# Patient Record
Sex: MALE | Race: BLACK OR AFRICAN AMERICAN | Employment: UNEMPLOYED | URBAN - METROPOLITAN AREA
[De-identification: names, ages, dates, MRNs, and addresses within clinical notes are randomized per-mention and may not be internally consistent; named-entity substitution may affect disease eponyms.]

---

## 2018-01-01 ENCOUNTER — ANESTHESIA (OUTPATIENT)
Dept: OPERATING ROOM | Age: 78
DRG: 378 | End: 2018-01-01
Payer: MEDICARE

## 2018-01-01 ENCOUNTER — APPOINTMENT (OUTPATIENT)
Dept: CT IMAGING | Age: 78
DRG: 378 | End: 2018-01-01
Payer: MEDICARE

## 2018-01-01 ENCOUNTER — APPOINTMENT (OUTPATIENT)
Dept: GENERAL RADIOLOGY | Age: 78
DRG: 378 | End: 2018-01-01
Payer: MEDICARE

## 2018-01-01 ENCOUNTER — HOSPITAL ENCOUNTER (INPATIENT)
Age: 78
LOS: 2 days | DRG: 378 | End: 2018-07-10
Attending: FAMILY MEDICINE | Admitting: INTERNAL MEDICINE
Payer: MEDICARE

## 2018-01-01 ENCOUNTER — ANESTHESIA EVENT (OUTPATIENT)
Dept: OPERATING ROOM | Age: 78
DRG: 378 | End: 2018-01-01
Payer: MEDICARE

## 2018-01-01 VITALS
RESPIRATION RATE: 20 BRPM | BODY MASS INDEX: 15.05 KG/M2 | SYSTOLIC BLOOD PRESSURE: 95 MMHG | HEIGHT: 72 IN | HEART RATE: 90 BPM | OXYGEN SATURATION: 99 % | TEMPERATURE: 97 F | WEIGHT: 111.1 LBS | DIASTOLIC BLOOD PRESSURE: 41 MMHG

## 2018-01-01 VITALS — OXYGEN SATURATION: 100 % | DIASTOLIC BLOOD PRESSURE: 118 MMHG | SYSTOLIC BLOOD PRESSURE: 153 MMHG

## 2018-01-01 DIAGNOSIS — E87.20 LACTIC ACIDOSIS: ICD-10-CM

## 2018-01-01 DIAGNOSIS — K62.5 RECTAL BLEEDING: ICD-10-CM

## 2018-01-01 DIAGNOSIS — K92.2 GI BLEED DUE TO NSAIDS: Primary | ICD-10-CM

## 2018-01-01 DIAGNOSIS — T39.395A GI BLEED DUE TO NSAIDS: Primary | ICD-10-CM

## 2018-01-01 LAB
ABO/RH: NORMAL
ALBUMIN SERPL-MCNC: 3.6 G/DL (ref 3.9–4.9)
ALP BLD-CCNC: 94 U/L (ref 35–104)
ALT SERPL-CCNC: 9 U/L (ref 0–41)
ANION GAP SERPL CALCULATED.3IONS-SCNC: 19 MEQ/L (ref 7–13)
ANION GAP SERPL CALCULATED.3IONS-SCNC: 19 MEQ/L (ref 7–13)
ANTIBODY SCREEN: NORMAL
AST SERPL-CCNC: 16 U/L (ref 0–40)
BASE EXCESS ARTERIAL: -17 (ref -3–3)
BASOPHILS ABSOLUTE: 0 K/UL (ref 0–0.2)
BASOPHILS ABSOLUTE: 0 K/UL (ref 0–0.2)
BASOPHILS RELATIVE PERCENT: 0.1 %
BASOPHILS RELATIVE PERCENT: 0.2 %
BILIRUB SERPL-MCNC: 0.7 MG/DL (ref 0–1.2)
BLOOD BANK DISPENSE STATUS: NORMAL
BLOOD BANK PRODUCT CODE: NORMAL
BPU ID: NORMAL
BUN BLDV-MCNC: 37 MG/DL (ref 8–23)
BUN BLDV-MCNC: 50 MG/DL (ref 8–23)
CALCIUM SERPL-MCNC: 8.5 MG/DL (ref 8.6–10.2)
CALCIUM SERPL-MCNC: 8.8 MG/DL (ref 8.6–10.2)
CHLORIDE BLD-SCNC: 108 MEQ/L (ref 98–107)
CHLORIDE BLD-SCNC: 109 MEQ/L (ref 98–107)
CO2: 14 MEQ/L (ref 22–29)
CO2: 17 MEQ/L (ref 22–29)
CREAT SERPL-MCNC: 0.93 MG/DL (ref 0.7–1.2)
CREAT SERPL-MCNC: 1.46 MG/DL (ref 0.7–1.2)
DESCRIPTION BLOOD BANK: NORMAL
EKG ATRIAL RATE: 94 BPM
EKG P AXIS: 57 DEGREES
EKG P-R INTERVAL: 120 MS
EKG Q-T INTERVAL: 352 MS
EKG QRS DURATION: 64 MS
EKG QTC CALCULATION (BAZETT): 440 MS
EKG R AXIS: -4 DEGREES
EKG T AXIS: 55 DEGREES
EKG VENTRICULAR RATE: 94 BPM
EOSINOPHILS ABSOLUTE: 0 K/UL (ref 0–0.7)
EOSINOPHILS ABSOLUTE: 0 K/UL (ref 0–0.7)
EOSINOPHILS RELATIVE PERCENT: 0 %
EOSINOPHILS RELATIVE PERCENT: 0 %
GFR AFRICAN AMERICAN: 56.4
GFR AFRICAN AMERICAN: >60
GFR NON-AFRICAN AMERICAN: 46.6
GFR NON-AFRICAN AMERICAN: >60
GLOBULIN: 3 G/DL (ref 2.3–3.5)
GLUCOSE BLD-MCNC: 149 MG/DL (ref 74–109)
GLUCOSE BLD-MCNC: 200 MG/DL (ref 74–109)
HCO3 ARTERIAL: 13.6 MMOL/L (ref 21–29)
HCT VFR BLD CALC: 28 % (ref 42–52)
HCT VFR BLD CALC: 28.9 % (ref 42–52)
HCT VFR BLD CALC: 29.5 % (ref 42–52)
HCT VFR BLD CALC: 30.3 % (ref 42–52)
HEMOGLOBIN: 8.6 G/DL (ref 14–18)
HEMOGLOBIN: 8.9 G/DL (ref 14–18)
HEMOGLOBIN: 9 G/DL (ref 14–18)
HEMOGLOBIN: 9.7 G/DL (ref 14–18)
INR BLD: 1.3
LACTATE: 7.83 MMOL/L (ref 0.4–2)
LACTIC ACID, SEPSIS: 8.3 MMOL/L (ref 0.5–1.9)
LACTIC ACID: 6.8 MMOL/L (ref 0.5–2.2)
LACTIC ACID: 7.2 MMOL/L (ref 0.5–2.2)
LACTIC ACID: 9.3 MMOL/L (ref 0.5–2.2)
LYMPHOCYTES ABSOLUTE: 0.8 K/UL (ref 1–4.8)
LYMPHOCYTES ABSOLUTE: 0.9 K/UL (ref 1–4.8)
LYMPHOCYTES RELATIVE PERCENT: 5.3 %
LYMPHOCYTES RELATIVE PERCENT: 7.4 %
MCH RBC QN AUTO: 26 PG (ref 27–31.3)
MCH RBC QN AUTO: 26.6 PG (ref 27–31.3)
MCHC RBC AUTO-ENTMCNC: 30.5 % (ref 33–37)
MCHC RBC AUTO-ENTMCNC: 31.3 % (ref 33–37)
MCV RBC AUTO: 84.7 FL (ref 80–100)
MCV RBC AUTO: 85.1 FL (ref 80–100)
MONOCYTES ABSOLUTE: 0.6 K/UL (ref 0.2–0.8)
MONOCYTES ABSOLUTE: 0.8 K/UL (ref 0.2–0.8)
MONOCYTES RELATIVE PERCENT: 4.6 %
MONOCYTES RELATIVE PERCENT: 5.2 %
NEUTROPHILS ABSOLUTE: 16.2 K/UL (ref 1.4–6.5)
NEUTROPHILS ABSOLUTE: 9.8 K/UL (ref 1.4–6.5)
NEUTROPHILS RELATIVE PERCENT: 87.2 %
NEUTROPHILS RELATIVE PERCENT: 90 %
O2 SAT, ARTERIAL: 100 % (ref 93–100)
PCO2 ARTERIAL: 47 MM HG (ref 35–45)
PDW BLD-RTO: 17.6 % (ref 11.5–14.5)
PDW BLD-RTO: 18.2 % (ref 11.5–14.5)
PERFORMED ON: ABNORMAL
PH ARTERIAL: 7.07 (ref 7.35–7.45)
PLATELET # BLD: 212 K/UL (ref 130–400)
PLATELET # BLD: 232 K/UL (ref 130–400)
PO2 ARTERIAL: 266 MM HG (ref 75–108)
POC FIO2: 15
POC SAMPLE TYPE: ABNORMAL
POTASSIUM REFLEX MAGNESIUM: 4.8 MEQ/L (ref 3.5–5.1)
POTASSIUM SERPL-SCNC: 4.5 MEQ/L (ref 3.5–5.1)
PROTHROMBIN TIME: 13.2 SEC (ref 9.6–12.3)
RBC # BLD: 3.29 M/UL (ref 4.7–6.1)
RBC # BLD: 3.41 M/UL (ref 4.7–6.1)
SODIUM BLD-SCNC: 142 MEQ/L (ref 132–144)
SODIUM BLD-SCNC: 144 MEQ/L (ref 132–144)
TCO2 ARTERIAL: 15 (ref 22–29)
TOTAL PROTEIN: 6.6 G/DL (ref 6.4–8.1)
WBC # BLD: 11.3 K/UL (ref 4.8–10.8)
WBC # BLD: 18 K/UL (ref 4.8–10.8)

## 2018-01-01 PROCEDURE — 0DJ08ZZ INSPECTION OF UPPER INTESTINAL TRACT, VIA NATURAL OR ARTIFICIAL OPENING ENDOSCOPIC: ICD-10-PCS | Performed by: SPECIALIST

## 2018-01-01 PROCEDURE — 85014 HEMATOCRIT: CPT

## 2018-01-01 PROCEDURE — 3700000001 HC ADD 15 MINUTES (ANESTHESIA): Performed by: SPECIALIST

## 2018-01-01 PROCEDURE — 1210000000 HC MED SURG R&B

## 2018-01-01 PROCEDURE — 6360000002 HC RX W HCPCS: Performed by: INTERNAL MEDICINE

## 2018-01-01 PROCEDURE — 2500000003 HC RX 250 WO HCPCS: Performed by: FAMILY MEDICINE

## 2018-01-01 PROCEDURE — 2580000003 HC RX 258: Performed by: INTERNAL MEDICINE

## 2018-01-01 PROCEDURE — C9113 INJ PANTOPRAZOLE SODIUM, VIA: HCPCS | Performed by: INTERNAL MEDICINE

## 2018-01-01 PROCEDURE — 6360000002 HC RX W HCPCS: Performed by: NURSE ANESTHETIST, CERTIFIED REGISTERED

## 2018-01-01 PROCEDURE — 85025 COMPLETE CBC W/AUTO DIFF WBC: CPT

## 2018-01-01 PROCEDURE — 71045 X-RAY EXAM CHEST 1 VIEW: CPT

## 2018-01-01 PROCEDURE — 2700000000 HC OXYGEN THERAPY PER DAY

## 2018-01-01 PROCEDURE — 3700000000 HC ANESTHESIA ATTENDED CARE: Performed by: SPECIALIST

## 2018-01-01 PROCEDURE — 2500000003 HC RX 250 WO HCPCS: Performed by: NURSE ANESTHETIST, CERTIFIED REGISTERED

## 2018-01-01 PROCEDURE — 36415 COLL VENOUS BLD VENIPUNCTURE: CPT

## 2018-01-01 PROCEDURE — 80053 COMPREHEN METABOLIC PANEL: CPT

## 2018-01-01 PROCEDURE — 6360000002 HC RX W HCPCS: Performed by: FAMILY MEDICINE

## 2018-01-01 PROCEDURE — 85018 HEMOGLOBIN: CPT

## 2018-01-01 PROCEDURE — 86900 BLOOD TYPING SEROLOGIC ABO: CPT

## 2018-01-01 PROCEDURE — 86850 RBC ANTIBODY SCREEN: CPT

## 2018-01-01 PROCEDURE — 87040 BLOOD CULTURE FOR BACTERIA: CPT

## 2018-01-01 PROCEDURE — 83605 ASSAY OF LACTIC ACID: CPT

## 2018-01-01 PROCEDURE — 7100000000 HC PACU RECOVERY - FIRST 15 MIN: Performed by: SPECIALIST

## 2018-01-01 PROCEDURE — 2580000003 HC RX 258: Performed by: FAMILY MEDICINE

## 2018-01-01 PROCEDURE — 82803 BLOOD GASES ANY COMBINATION: CPT

## 2018-01-01 PROCEDURE — 7100000001 HC PACU RECOVERY - ADDTL 15 MIN: Performed by: SPECIALIST

## 2018-01-01 PROCEDURE — 93010 ELECTROCARDIOGRAM REPORT: CPT | Performed by: INTERNAL MEDICINE

## 2018-01-01 PROCEDURE — 74018 RADEX ABDOMEN 1 VIEW: CPT

## 2018-01-01 PROCEDURE — 2580000003 HC RX 258: Performed by: SPECIALIST

## 2018-01-01 PROCEDURE — C9113 INJ PANTOPRAZOLE SODIUM, VIA: HCPCS | Performed by: FAMILY MEDICINE

## 2018-01-01 PROCEDURE — 99284 EMERGENCY DEPT VISIT MOD MDM: CPT

## 2018-01-01 PROCEDURE — 86901 BLOOD TYPING SEROLOGIC RH(D): CPT

## 2018-01-01 PROCEDURE — 85610 PROTHROMBIN TIME: CPT

## 2018-01-01 PROCEDURE — 93005 ELECTROCARDIOGRAM TRACING: CPT

## 2018-01-01 PROCEDURE — 80048 BASIC METABOLIC PNL TOTAL CA: CPT

## 2018-01-01 PROCEDURE — 99222 1ST HOSP IP/OBS MODERATE 55: CPT | Performed by: SURGERY

## 2018-01-01 PROCEDURE — 74176 CT ABD & PELVIS W/O CONTRAST: CPT

## 2018-01-01 PROCEDURE — 86920 COMPATIBILITY TEST SPIN: CPT

## 2018-01-01 PROCEDURE — 3609017100 HC EGD: Performed by: SPECIALIST

## 2018-01-01 RX ORDER — SODIUM CHLORIDE 9 MG/ML
INJECTION, SOLUTION INTRAVENOUS CONTINUOUS
Status: DISCONTINUED | OUTPATIENT
Start: 2018-01-01 | End: 2018-01-01 | Stop reason: HOSPADM

## 2018-01-01 RX ORDER — SODIUM CHLORIDE 0.9 % (FLUSH) 0.9 %
10 SYRINGE (ML) INJECTION EVERY 12 HOURS SCHEDULED
Status: DISCONTINUED | OUTPATIENT
Start: 2018-01-01 | End: 2018-01-01

## 2018-01-01 RX ORDER — SODIUM CHLORIDE 0.9 % (FLUSH) 0.9 %
10 SYRINGE (ML) INJECTION EVERY 12 HOURS SCHEDULED
Status: DISCONTINUED | OUTPATIENT
Start: 2018-01-01 | End: 2018-01-01 | Stop reason: HOSPADM

## 2018-01-01 RX ORDER — SODIUM CHLORIDE 9 MG/ML
INJECTION, SOLUTION INTRAVENOUS ONCE
Status: COMPLETED | OUTPATIENT
Start: 2018-01-01 | End: 2018-01-01

## 2018-01-01 RX ORDER — METOCLOPRAMIDE HYDROCHLORIDE 5 MG/ML
10 INJECTION INTRAMUSCULAR; INTRAVENOUS
Status: DISCONTINUED | OUTPATIENT
Start: 2018-01-01 | End: 2018-01-01 | Stop reason: HOSPADM

## 2018-01-01 RX ORDER — HYDROCODONE BITARTRATE AND ACETAMINOPHEN 5; 325 MG/1; MG/1
2 TABLET ORAL PRN
Status: DISCONTINUED | OUTPATIENT
Start: 2018-01-01 | End: 2018-01-01 | Stop reason: HOSPADM

## 2018-01-01 RX ORDER — 0.9 % SODIUM CHLORIDE 0.9 %
10 VIAL (ML) INJECTION EVERY 12 HOURS
Status: DISCONTINUED | OUTPATIENT
Start: 2018-01-01 | End: 2018-01-01 | Stop reason: HOSPADM

## 2018-01-01 RX ORDER — HYDROCODONE BITARTRATE AND ACETAMINOPHEN 5; 325 MG/1; MG/1
1 TABLET ORAL PRN
Status: DISCONTINUED | OUTPATIENT
Start: 2018-01-01 | End: 2018-01-01 | Stop reason: HOSPADM

## 2018-01-01 RX ORDER — LIDOCAINE HYDROCHLORIDE 10 MG/ML
1 INJECTION, SOLUTION EPIDURAL; INFILTRATION; INTRACAUDAL; PERINEURAL
Status: DISCONTINUED | OUTPATIENT
Start: 2018-01-01 | End: 2018-01-01 | Stop reason: HOSPADM

## 2018-01-01 RX ORDER — 0.9 % SODIUM CHLORIDE 0.9 %
10 VIAL (ML) INJECTION PRN
Status: DISCONTINUED | OUTPATIENT
Start: 2018-01-01 | End: 2018-01-01 | Stop reason: HOSPADM

## 2018-01-01 RX ORDER — SODIUM CHLORIDE 0.9 % (FLUSH) 0.9 %
10 SYRINGE (ML) INJECTION PRN
Status: DISCONTINUED | OUTPATIENT
Start: 2018-01-01 | End: 2018-01-01

## 2018-01-01 RX ORDER — DONEPEZIL HYDROCHLORIDE 10 MG/1
10 TABLET, FILM COATED ORAL NIGHTLY
Status: DISCONTINUED | OUTPATIENT
Start: 2018-01-01 | End: 2018-01-01 | Stop reason: HOSPADM

## 2018-01-01 RX ORDER — MORPHINE SULFATE 10 MG/.5ML
2 SOLUTION ORAL
Status: DISCONTINUED | OUTPATIENT
Start: 2018-01-01 | End: 2018-01-01 | Stop reason: HOSPADM

## 2018-01-01 RX ORDER — CIPROFLOXACIN 2 MG/ML
400 INJECTION, SOLUTION INTRAVENOUS ONCE
Status: COMPLETED | OUTPATIENT
Start: 2018-01-01 | End: 2018-01-01

## 2018-01-01 RX ORDER — 0.9 % SODIUM CHLORIDE 0.9 %
1000 INTRAVENOUS SOLUTION INTRAVENOUS ONCE
Status: COMPLETED | OUTPATIENT
Start: 2018-01-01 | End: 2018-01-01

## 2018-01-01 RX ORDER — SODIUM CHLORIDE 0.9 % (FLUSH) 0.9 %
10 SYRINGE (ML) INJECTION PRN
Status: DISCONTINUED | OUTPATIENT
Start: 2018-01-01 | End: 2018-01-01 | Stop reason: HOSPADM

## 2018-01-01 RX ORDER — BACLOFEN 10 MG/1
5 TABLET ORAL 2 TIMES DAILY
Status: DISCONTINUED | OUTPATIENT
Start: 2018-01-01 | End: 2018-01-01 | Stop reason: HOSPADM

## 2018-01-01 RX ORDER — MEPERIDINE HYDROCHLORIDE 25 MG/ML
12.5 INJECTION INTRAMUSCULAR; INTRAVENOUS; SUBCUTANEOUS EVERY 5 MIN PRN
Status: DISCONTINUED | OUTPATIENT
Start: 2018-01-01 | End: 2018-01-01 | Stop reason: HOSPADM

## 2018-01-01 RX ORDER — ONDANSETRON 2 MG/ML
4 INJECTION INTRAMUSCULAR; INTRAVENOUS
Status: DISCONTINUED | OUTPATIENT
Start: 2018-01-01 | End: 2018-01-01 | Stop reason: HOSPADM

## 2018-01-01 RX ORDER — DIPHENHYDRAMINE HYDROCHLORIDE 50 MG/ML
12.5 INJECTION INTRAMUSCULAR; INTRAVENOUS
Status: DISCONTINUED | OUTPATIENT
Start: 2018-01-01 | End: 2018-01-01 | Stop reason: HOSPADM

## 2018-01-01 RX ORDER — MAGNESIUM HYDROXIDE 1200 MG/15ML
LIQUID ORAL PRN
Status: DISCONTINUED | OUTPATIENT
Start: 2018-01-01 | End: 2018-01-01 | Stop reason: HOSPADM

## 2018-01-01 RX ORDER — PANTOPRAZOLE SODIUM 40 MG/10ML
80 INJECTION, POWDER, LYOPHILIZED, FOR SOLUTION INTRAVENOUS DAILY
Status: DISCONTINUED | OUTPATIENT
Start: 2018-01-01 | End: 2018-01-01

## 2018-01-01 RX ORDER — 0.9 % SODIUM CHLORIDE 0.9 %
10 VIAL (ML) INJECTION EVERY 12 HOURS SCHEDULED
Status: DISCONTINUED | OUTPATIENT
Start: 2018-01-01 | End: 2018-01-01 | Stop reason: HOSPADM

## 2018-01-01 RX ORDER — PROPOFOL 10 MG/ML
INJECTION, EMULSION INTRAVENOUS PRN
Status: DISCONTINUED | OUTPATIENT
Start: 2018-01-01 | End: 2018-01-01 | Stop reason: SDUPTHER

## 2018-01-01 RX ORDER — KETAMINE HYDROCHLORIDE 100 MG/ML
INJECTION, SOLUTION INTRAMUSCULAR; INTRAVENOUS PRN
Status: DISCONTINUED | OUTPATIENT
Start: 2018-01-01 | End: 2018-01-01 | Stop reason: SDUPTHER

## 2018-01-01 RX ORDER — METRONIDAZOLE 500 MG/1
500 TABLET ORAL ONCE
Status: DISCONTINUED | OUTPATIENT
Start: 2018-01-01 | End: 2018-01-01

## 2018-01-01 RX ORDER — FENTANYL CITRATE 50 UG/ML
50 INJECTION, SOLUTION INTRAMUSCULAR; INTRAVENOUS EVERY 10 MIN PRN
Status: DISCONTINUED | OUTPATIENT
Start: 2018-01-01 | End: 2018-01-01 | Stop reason: HOSPADM

## 2018-01-01 RX ORDER — MORPHINE SULFATE 2 MG/ML
1 INJECTION, SOLUTION INTRAMUSCULAR; INTRAVENOUS
Status: DISCONTINUED | OUTPATIENT
Start: 2018-01-01 | End: 2018-01-01 | Stop reason: HOSPADM

## 2018-01-01 RX ORDER — MEMANTINE HYDROCHLORIDE 10 MG/1
10 TABLET ORAL 2 TIMES DAILY
Status: DISCONTINUED | OUTPATIENT
Start: 2018-01-01 | End: 2018-01-01 | Stop reason: HOSPADM

## 2018-01-01 RX ORDER — SODIUM CHLORIDE 0.9 % (FLUSH) 0.9 %
SYRINGE (ML) INJECTION
Status: COMPLETED | OUTPATIENT
Start: 2018-01-01 | End: 2018-01-01

## 2018-01-01 RX ORDER — MORPHINE SULFATE 2 MG/ML
1 INJECTION, SOLUTION INTRAMUSCULAR; INTRAVENOUS ONCE
Status: COMPLETED | OUTPATIENT
Start: 2018-01-01 | End: 2018-01-01

## 2018-01-01 RX ORDER — PANTOPRAZOLE SODIUM 40 MG/10ML
40 INJECTION, POWDER, LYOPHILIZED, FOR SOLUTION INTRAVENOUS EVERY 12 HOURS
Status: DISCONTINUED | OUTPATIENT
Start: 2018-01-01 | End: 2018-01-01 | Stop reason: HOSPADM

## 2018-01-01 RX ORDER — ATROPINE SULFATE 10 MG/ML
2 SOLUTION/ DROPS OPHTHALMIC ONCE
Status: DISCONTINUED | OUTPATIENT
Start: 2018-01-01 | End: 2018-01-01 | Stop reason: HOSPADM

## 2018-01-01 RX ORDER — ONDANSETRON 2 MG/ML
4 INJECTION INTRAMUSCULAR; INTRAVENOUS EVERY 6 HOURS PRN
Status: DISCONTINUED | OUTPATIENT
Start: 2018-01-01 | End: 2018-01-01 | Stop reason: HOSPADM

## 2018-01-01 RX ORDER — LEVOTHYROXINE SODIUM 0.05 MG/1
50 TABLET ORAL DAILY
Status: DISCONTINUED | OUTPATIENT
Start: 2018-01-01 | End: 2018-01-01 | Stop reason: HOSPADM

## 2018-01-01 RX ORDER — 0.9 % SODIUM CHLORIDE 0.9 %
10 VIAL (ML) INJECTION DAILY
Status: DISCONTINUED | OUTPATIENT
Start: 2018-01-01 | End: 2018-01-01

## 2018-01-01 RX ORDER — ACETAMINOPHEN 80 MG
TABLET,CHEWABLE ORAL ONCE
Status: DISCONTINUED | OUTPATIENT
Start: 2018-01-01 | End: 2018-01-01 | Stop reason: HOSPADM

## 2018-01-01 RX ORDER — BACLOFEN 10 MG/1
5 TABLET ORAL 2 TIMES DAILY
COMMUNITY

## 2018-01-01 RX ORDER — NALOXONE HYDROCHLORIDE 0.4 MG/ML
0.4 INJECTION, SOLUTION INTRAMUSCULAR; INTRAVENOUS; SUBCUTANEOUS PRN
Status: DISCONTINUED | OUTPATIENT
Start: 2018-01-01 | End: 2018-01-01 | Stop reason: HOSPADM

## 2018-01-01 RX ORDER — HALOPERIDOL 0.5 MG/1
0.5 TABLET ORAL EVERY 8 HOURS PRN
Status: DISCONTINUED | OUTPATIENT
Start: 2018-01-01 | End: 2018-01-01 | Stop reason: HOSPADM

## 2018-01-01 RX ORDER — ACETAMINOPHEN 325 MG/1
650 TABLET ORAL EVERY 4 HOURS PRN
Status: DISCONTINUED | OUTPATIENT
Start: 2018-01-01 | End: 2018-01-01 | Stop reason: HOSPADM

## 2018-01-01 RX ADMIN — SODIUM CHLORIDE: 9 INJECTION, SOLUTION INTRAVENOUS at 18:33

## 2018-01-01 RX ADMIN — SODIUM CHLORIDE 1000 ML: 9 INJECTION, SOLUTION INTRAVENOUS at 17:49

## 2018-01-01 RX ADMIN — MORPHINE SULFATE 1 MG: 2 INJECTION, SOLUTION INTRAMUSCULAR; INTRAVENOUS at 12:08

## 2018-01-01 RX ADMIN — Medication 10 ML: at 17:49

## 2018-01-01 RX ADMIN — SODIUM CHLORIDE: 9 INJECTION, SOLUTION INTRAVENOUS at 09:48

## 2018-01-01 RX ADMIN — Medication 10 ML: at 09:48

## 2018-01-01 RX ADMIN — NALOXONE HYDROCHLORIDE 0.4 MG: 0.4 INJECTION, SOLUTION INTRAMUSCULAR; INTRAVENOUS; SUBCUTANEOUS at 17:19

## 2018-01-01 RX ADMIN — MORPHINE SULFATE 1 MG: 2 INJECTION, SOLUTION INTRAMUSCULAR; INTRAVENOUS at 23:25

## 2018-01-01 RX ADMIN — PROPOFOL 12 MG: 10 INJECTION, EMULSION INTRAVENOUS at 15:20

## 2018-01-01 RX ADMIN — PANTOPRAZOLE SODIUM 80 MG: 40 INJECTION, POWDER, FOR SOLUTION INTRAVENOUS at 17:49

## 2018-01-01 RX ADMIN — MORPHINE SULFATE 1 MG: 2 INJECTION, SOLUTION INTRAMUSCULAR; INTRAVENOUS at 18:29

## 2018-01-01 RX ADMIN — CIPROFLOXACIN 400 MG: 2 INJECTION, SOLUTION INTRAVENOUS at 19:05

## 2018-01-01 RX ADMIN — SODIUM CHLORIDE: 9 INJECTION, SOLUTION INTRAVENOUS at 17:33

## 2018-01-01 RX ADMIN — Medication 999 ML: at 17:27

## 2018-01-01 RX ADMIN — SODIUM CHLORIDE: 9 INJECTION, SOLUTION INTRAVENOUS at 23:33

## 2018-01-01 RX ADMIN — PANTOPRAZOLE SODIUM 40 MG: 40 INJECTION, POWDER, FOR SOLUTION INTRAVENOUS at 09:48

## 2018-01-01 RX ADMIN — KETAMINE HYDROCHLORIDE 1.2 MG: 100 INJECTION, SOLUTION, CONCENTRATE INTRAMUSCULAR; INTRAVENOUS at 15:20

## 2018-01-01 RX ADMIN — METRONIDAZOLE 500 MG: 500 INJECTION, SOLUTION INTRAVENOUS at 23:26

## 2018-01-01 ASSESSMENT — PULMONARY FUNCTION TESTS
PIF_VALUE: 1

## 2018-01-01 ASSESSMENT — PAIN SCALES - GENERAL
PAINLEVEL_OUTOF10: 3
PAINLEVEL_OUTOF10: 8
PAINLEVEL_OUTOF10: 0
PAINLEVEL_OUTOF10: 6

## 2018-01-01 ASSESSMENT — ENCOUNTER SYMPTOMS
ABDOMINAL PAIN: 0
HEMATOCHEZIA: 1
HEMATEMESIS: 0

## 2018-01-01 ASSESSMENT — PAIN SCALES - WONG BAKER: WONGBAKER_NUMERICALRESPONSE: 0

## 2018-01-01 ASSESSMENT — PAIN DESCRIPTION - LOCATION: LOCATION: ABDOMEN

## 2018-01-01 ASSESSMENT — PAIN DESCRIPTION - PAIN TYPE: TYPE: ACUTE PAIN

## 2018-07-08 PROBLEM — K92.2 GIB (GASTROINTESTINAL BLEEDING): Status: ACTIVE | Noted: 2018-01-01

## 2018-07-08 NOTE — ED PROVIDER NOTES
DIAGNOSTIC RESULTS    Co-signs this chart in the absence of a cardiologist.        RADIOLOGY:   Non-plain film images such as CT, Ultrasound and MRI are read by the radiologist. Plain radiographic images are visualized and preliminarily interpreted by the em  Interpretation per the Radiologist below, if available at the time of this note:    XR CHEST PORTABLE    (Results Pending)   No acute disease      ED BEDSIDE ULTRASOUND:   Performed by ED Physician - none    LABS:  Labs Reviewed   COMPREHENSIVE METABOLIC PANEL - Abnormal; Notable for the following:        Result Value    Chloride 108 (*)     CO2 17 (*)     Anion Gap 19 (*)     Glucose 200 (*)     BUN 37 (*)     Alb 3.6 (*)     All other components within normal limits   CBC WITH AUTO DIFFERENTIAL - Abnormal; Notable for the following:     WBC 18.0 (*)     RBC 3.29 (*)     Hemoglobin 8.6 (*)     Hematocrit 28.0 (*)     MCH 26.0 (*)     MCHC 30.5 (*)     RDW 18.2 (*)     Neutrophils # 16.2 (*)     Lymphocytes # 0.9 (*)     All other components within normal limits   PROTIME-INR - Abnormal; Notable for the following:     Protime 13.2 (*)     All other components within normal limits   LACTATE, SEPSIS - Abnormal; Notable for the following:     Lactic Acid, Sepsis 8.3 (*)     All other components within normal limits    Narrative:     CALL  [facility] tel. [phone],  Lactic Acid results called to and read back by Brian Mckeon RN, 07/08/2018  19:18, by Krunal Adler   CULTURE BLOOD #1   CULTURE BLOOD #2   LACTATE, SEPSIS   TYPE AND SCREEN       All other labs were within normal range or not returned as of this dictation.     EMERGENCY DEPARTMENT COURSE and DIFFERENTIAL DIAGNOSIS/MDM:   Vitals:    Vitals:    07/08/18 1729 07/08/18 1853   BP: (!) 98/56 120/77   Pulse: 90 90   Resp: (!) 34 (!) 32   Temp: 97.6 °F (36.4 °C)    TempSrc: Temporal    SpO2: (!) 88% 100%     MDM  Number of Diagnoses or Management Options  GI bleed due to NSAIDs:   Rectal bleeding:   Diagnosis management comments: 65  Surgical with Very advanced dementia currently in hospice for at least 1 year and 2 months now. Presented to the ER for evaluation of bright blood per rectum that started this morning H and is currently a hospice patient to take baclofen half a tablet twice a day. Patient's wife and he's POA stated that she wanted the patient evaluated only for the rectal bleeding requests that the patient stay DNR patient labs significant for a hemoglobin of 8.6 rectal exam confirmed blood per rectum was given IV fluids Protonix will be admitted under the hospitalist service with an active contact with hospice for IV fluid possible blood in the morning of her hemoglobin dropped is also noted to have a white blood cells of 18 so empiric antibiotic was started in the ER    After it was agreed upon with the patient family and the hospice nurse patient will state DNR CC during this admission and will continue to be under hospice care       Amount and/or Complexity of Data Reviewed  Clinical lab tests: ordered and reviewed  Tests in the radiology section of CPT®: ordered and reviewed      CONSULTS:  IP CONSULT TO GI  IP CONSULT TO SOCIAL WORK  IP CONSULT TO HOSPICE    PROCEDURES:  Unless otherwise noted below, none     Procedures    FINAL IMPRESSION      1. GI bleed due to NSAIDs    2. Rectal bleeding    3.  Lactic acidosis          DISPOSITION/PLAN   DISPOSITION        PATIENT REFERRED TO:  Miky Hernandez MD  [address] [place] [address] [address] [phone]            DISCHARGE MEDICATIONS:  New Prescriptions    No medications on file          (Please note that portions of this note were completed with a voice recognition program.  Efforts were made to edit the dictations but occasionally words are mis-transcribed.)    Kimberly Staples MD (electronically signed)  Attending Emergency Physician          Therese Ledbetter MD  07/08/18 1769

## 2018-07-08 NOTE — H&P
Hospital Medicine History & Physical      PCP: Jordan Quigley MD    Date of Admission: 7/8/2018    Date of Service: Pt seen/examined on 7/8/18 and Admitted to Inpatient with expected LOS greater than two midnights due to medical therapy. Chief Complaint:  Rectal bleeding      History Of Present Illness:      65 y.o. male who presented to [facility] ED with complaint of rectal bleeding that started this morning. Patient has a history of dementia, HDL, HTN, hypothyroid. The patient at baseline is nonverbal. Per the patients wife that is at the bedside he is an established patient with [facility] (formerly [facility]) for the past year and earlier this morning she noticed when changing him he had a mix of bright and dark red blood in brief mixed with stool. She states as the day when on the bowel movements seemed to become more bloody with clots. Patient has never been on any form of anticoagulants and does not even take a daily aspirin. Patients wife does state she will give him liquid motrin for his arthritis pain as needed. He has had no change in eat habits, bowel movements, and no decrease in urine output. She states he has not showed any form of distress or pain and has had no vomiting or diarrhea. Past Medical History:          Diagnosis Date    Dementia     Hyperlipidemia     Hypertension     Hypothyroidism        Past Surgical History:          Procedure Laterality Date    POLYPECTOMY         Medications Prior to Admission:      Prior to Admission medications    Medication Sig Start Date End Date Taking? Authorizing Provider   atorvastatin (LIPITOR) 10 MG tablet Take 10 mg by mouth daily. Historical Provider, MD   diltiazem (DILACOR XR) 180 MG XR capsule Take 180 mg by mouth daily. Historical Provider, MD   losartan (COZAAR) 100 MG tablet Take 100 mg by mouth daily.     Historical Provider, MD   acetaminophen (TYLENOL) 325 MG tablet Take 650 mg by mouth every 4 hours as needed for Pain or Fever (DO NOT EXCEED 4GM IN 24 HOURS). 2/10/15   Historical Provider, MD   acetaminophen (TYLENOL) 650 MG suppository Place 650 mg rectally every 4 hours as needed for Fever or Pain (DO NOT EXCEED 4GM IN 24 HOURS). 2/10/15   Historical Provider, MD   aluminum-magnesium hydroxide 200-200 MG/5ML suspension Take 30 mLs by mouth as needed for Indigestion. 2/10/15   Historical Provider, MD   donepezil (ARICEPT) 10 MG tablet Take 10 mg by mouth nightly. 2/10/15   Historical Provider, MD   bisacodyl (DULCOLAX) 10 MG suppository Place 10 mg rectally as needed for Constipation. 2/10/15   Historical Provider, MD   docusate sodium (COLACE) 100 MG capsule Take 100 mg by mouth 2 times daily. 2/10/15   Historical Provider, MD   Sodium Phosphates (FLEET ENEMA RE) Place 1 Dose rectally as needed (if MOM ineffective). 2/10/15   Historical Provider, MD   guaiFENesin (ROBITUSSIN) 100 MG/5ML SOLN oral solution Take 10 mLs by mouth every 4 hours as needed for Cough. 2/10/15   Historical Provider, MD   haloperidol (HALDOL) 0.5 MG tablet Take 0.5 mg by mouth every 12 hours as needed. 2/10/15   Historical Provider, MD   magnesium hydroxide (MILK OF MAGNESIA) 400 MG/5ML suspension Take 30 mLs by mouth as needed for Constipation. 2/10/15   Historical Provider, MD   memantine (NAMENDA XR) 28 MG CP24 capsule Take 28 mg by mouth nightly. 2/10/15   Historical Provider, MD   nitroGLYCERIN (NITROSTAT) 0.4 MG SL tablet Place 0.4 mg under the tongue every 5 minutes as needed for Chest pain. 2/10/15   Historical Provider, MD   senna (SENNA LAX) 8.6 MG tablet Take 2 tablets by mouth nightly. 2/10/15   Historical Provider, MD   levothyroxine (SYNTHROID) 50 MCG tablet Take 50 mcg by mouth every morning. 2/11/15   Historical Provider, MD       Allergies:  Patient has no known allergies. Social History:      The patient currently lives at home with wife    TOBACCO:   reports that he has never smoked.  He has never used smokeless tobacco.  ETOH:   reports that he does

## 2018-07-08 NOTE — ED NOTES
Bed: 21  Expected date: 7/8/18  Expected time: 5:05 PM  Means of arrival: [facility]  Comments:  Hospice patient passing blood clots.       Anastasia Farr RN  07/08/18 2907

## 2018-07-08 NOTE — PROGRESS NOTES
resolved hospital problems. *      ** Total time spent reviewing medical records, evaluating patient, speaking with RN's and consultants where I was focused exclusively on this patient: 35 minutes. Subjective:   Admit Date: 7/8/2018  PCP: Aj Tolentino MD    Hypoxia overnight noted. Pt unable to verbalize complaints. RN reports abd distension. Daughter at bedside reports rapid breathing. States pt appears to be in pain. Objective:     Vitals:    07/09/18 0245 07/09/18 0355 07/09/18 0357 07/09/18 0753   BP:  (!) 105/59  (!) 98/52   Pulse:  96 111 106   Resp:    19   Temp:  98.6 °F (37 °C)  98.2 °F (36.8 °C)   TempSrc:    Oral   SpO2:  (!) 66% 100% 100%   Weight: 111 lb 1.6 oz (50.4 kg)      Height: 6' (1.829 m)        General appearance: Mild acute distress, awake but unresponsive, spontaneous eye movements. Edentulous  Lungs: CTAB, Diminished no exp wheezes, No rales , No rhonchi. No retractions; No use of accessory muscles  Heart:  S1, S2 normal, RRR, no MRG appreciated  Abdomen: (+) BS, soft, (+) diffuse TTP and (++) Distended no guarding or rigidity. Extremities:  no cyanosis, No edema bilat lower exts, no calf tenderness bilaterally.  Dry skin noted      Medications:      sodium chloride 125 mL/hr at 07/09/18 0948      sodium chloride flush  10 mL Intravenous 2 times per day    pantoprazole  40 mg Intravenous Q12H    And    sodium chloride (PF)  10 mL Intravenous Q12H    baclofen  5 mg Oral BID    donepezil  10 mg Oral Nightly    levothyroxine  50 mcg Oral Daily    memantine  10 mg Oral BID    pill splitter   Does not apply Once       LABS Reviewed    IMAGING Reviewed    Sagar Pak MD  Rounding Hospitalist

## 2018-07-09 NOTE — PROGRESS NOTES
Physical Therapy Missed Treatment   Facility/Department: [facility] MED SURG W869/B494-03    NAME: Joni Steward    : 1940 (65 y.o.)  MRN: [id number]    Account: [de-identified]  Gender: male    Patient off floor (CT scan). Will follow and attempt PT evaluation again at earliest availability.         Jesús Martinez, PT, 18 at 1:35 PM

## 2018-07-09 NOTE — ANESTHESIA PRE PROCEDURE
Department of Anesthesiology  Preprocedure Note       Name:  Alyssa Avery   Age:  65 y.o.  :  1940                                          MRN:  [id number]         Date:  2018      Surgeon: Pascual Reyes):  Martín Bustos MD    Procedure: Procedure(s):  EGD ESOPHAGOGASTRODUODENOSCOPY    Medications prior to admission:   Prior to Admission medications    Medication Sig Start Date End Date Taking? Authorizing Provider   baclofen (LIORESAL) 10 MG tablet Take 5 mg by mouth 2 times daily   Yes Historical Provider, MD   losartan (COZAAR) 100 MG tablet Take 100 mg by mouth daily. Yes Historical Provider, MD   acetaminophen (TYLENOL) 325 MG tablet Take 650 mg by mouth every 4 hours as needed for Pain or Fever (DO NOT EXCEED 4GM IN 24 HOURS). 2/10/15  Yes Historical Provider, MD   bisacodyl (DULCOLAX) 10 MG suppository Place 10 mg rectally as needed for Constipation. 2/10/15  Yes Historical Provider, MD   docusate sodium (COLACE) 100 MG capsule Take 100 mg by mouth 2 times daily. 2/10/15  Yes Historical Provider, MD   Sodium Phosphates (FLEET ENEMA RE) Place 1 Dose rectally as needed (if MOM ineffective). 2/10/15  Yes Historical Provider, MD   guaiFENesin (ROBITUSSIN) 100 MG/5ML SOLN oral solution Take 10 mLs by mouth every 4 hours as needed for Cough. 2/10/15  Yes Historical Provider, MD   haloperidol (HALDOL) 0.5 MG tablet Take 0.5 mg by mouth every 12 hours as needed. 2/10/15  Yes Historical Provider, MD   magnesium hydroxide (MILK OF MAGNESIA) 400 MG/5ML suspension Take 30 mLs by mouth as needed for Constipation. 2/10/15  Yes Historical Provider, MD   nitroGLYCERIN (NITROSTAT) 0.4 MG SL tablet Place 0.4 mg under the tongue every 5 minutes as needed for Chest pain. 2/10/15  Yes Historical Provider, MD   levothyroxine (SYNTHROID) 50 MCG tablet Take 50 mcg by mouth every morning. 2/11/15  Yes Historical Provider, MD   atorvastatin (LIPITOR) 10 MG tablet Take 10 mg by mouth daily.     Historical Provider, MD Social History:    Social History   Substance Use Topics    Smoking status: Never Smoker    Smokeless tobacco: Never Used    Alcohol use No                                Counseling given: Not Answered      Vital Signs (Current):   Vitals:    07/09/18 0245 07/09/18 0355 07/09/18 0357 07/09/18 0753   BP:  (!) 105/59  (!) 98/52   Pulse:  96 111 106   Resp:    19   Temp:  98.6 °F (37 °C)  98.2 °F (36.8 °C)   TempSrc:    Oral   SpO2:  (!) 66% 100% 100%   Weight: 111 lb 1.6 oz (50.4 kg)      Height: 6' (1.829 m)                                                 BP Readings from Last 3 Encounters:   07/09/18 (!) 98/52   02/27/15 100/63   02/23/15 108/60       NPO Status:                                                                                 BMI:   Wt Readings from Last 3 Encounters:   07/09/18 111 lb 1.6 oz (50.4 kg)   02/11/15 140 lb (63.5 kg)   02/10/15 140 lb (63.5 kg)     Body mass index is 15.07 kg/m². CBC:   Lab Results   Component Value Date    WBC 11.3 07/09/2018    RBC 3.41 07/09/2018    RBC 3.41 01/30/2012    HGB 9.7 07/09/2018    HCT 30.3 07/09/2018    MCV 84.7 07/09/2018    RDW 17.6 07/09/2018     07/09/2018       CMP:   Lab Results   Component Value Date     07/09/2018    K 4.8 07/09/2018     07/09/2018    CO2 14 07/09/2018    BUN 50 07/09/2018    CREATININE 1.46 07/09/2018    GFRAA 56.4 07/09/2018    LABGLOM 46.6 07/09/2018    GLUCOSE 149 07/09/2018    GLUCOSE 143 01/24/2012    PROT 6.6 07/08/2018    CALCIUM 8.5 07/09/2018    BILITOT 0.7 07/08/2018    ALKPHOS 94 07/08/2018    AST 16 07/08/2018    ALT 9 07/08/2018       POC Tests: No results for input(s): POCGLU, POCNA, POCK, POCCL, POCBUN, POCHEMO, POCHCT in the last 72 hours.     Coags:   Lab Results   Component Value Date    PROTIME 13.2 07/08/2018    PROTIME 11.8 01/24/2012    INR 1.3 07/08/2018    APTT 30.7 02/06/2015       HCG (If Applicable): No results found for: PREGTESTUR, PREGSERUM, HCG, HCGQUANT     ABGs: No results found for: PHART, PO2ART, KCT0RXL, BDH7JOH, BEART, A2OEUIPY     Type & Screen (If Applicable):  No results found for: LABABO, [address]    Anesthesia Evaluation  Patient summary reviewed and Nursing notes reviewed no history of anesthetic complications:   Airway: Mallampati: II  TM distance: >3 FB   Neck ROM: full  Mouth opening: > = 3 FB Dental: normal exam         Pulmonary:Negative Pulmonary ROS and normal exam                               Cardiovascular:    (+) hypertension: no interval change,       ECG reviewed                        Neuro/Psych:   (+) psychiatric history: stable without treatment            GI/Hepatic/Renal: Neg GI/Hepatic/Renal ROS            Endo/Other:    (+) hypothyroidism::., .                 Abdominal:           Vascular: negative vascular ROS. Anesthesia Plan      MAC     ASA 4       Induction: intravenous. Anesthetic plan and risks discussed with patient. Plan discussed with CRNA.     Attending anesthesiologist reviewed and agrees with Pre Eval content              Diya Tavares MD   7/9/2018

## 2018-07-09 NOTE — OP NOTE
Endoscopy Note    Patient: Alyssa Avery  YOB: 1940  MRN: [id number]  Date of Procedure: 7/9/2018    Pre-Op Diagnosis: gi bleeding    Post-Op Diagnosis: Same hiatal hernia and atrophic gastric mucosa. Procedure(s):  EGD ESOPHAGOGASTRODUODENOSCOPY    Anesthesia: Monitor Anesthesia Care    Surgeon(s):  Martín Bustos MD    Staff:  [employer]: Low Mars; Cherelle Benitez     Estimated Blood Loss: * No values recorded between 7/9/2018  3:15 PM and 7/9/2018  3:37 PM *    Specimens:   * No specimens in log *    Indications: This is a 65y.o. year old male who presents for upper endoscopy with h/o GI bleeding patient has high BUN/creatinine ratio and then upper endoscopy was done to rule out any upper GI pathology. Shamika Sykes Description of Procedure:  Informed consent was obtained from the patient after explanation of indications, benefits and possible risks and complications of the procedure. The patient was then taken to the endoscopy suite, placed in the left lateral decubitus position and the above IV sedation was administrered. Enemas video gastroscope was inserted into the esophagus and advanced to the distal duodenum. Esophageal mucosa is normal.  No inflammation or ulcerations were seen. GE JUNCTION at about 32 cm from the incisors. There was mild atrophy of the gastric mucosa noted. Duodenal bulb and post bulbar duodenum was examined and it was normal.  I was able to advance the scope into the proximal jejunum and that also appeared normal.    The patient tolerated the procedure well and was taken to the post anesthesia care unit in good condition. Impression:  Hiatal hernia and atrophic gastric mucosa no signs of any upper GI bleed. Recommendations: Discussed with family regarding further evaluation.   Considering his advanced age and other comorbid condition Patient is a high risk for any invasive procedures and they are not really sure they want him to continue any further

## 2018-07-09 NOTE — PROGRESS NOTES
Pt family was offered to give pt either fleets or soap suds enema to help with comfort for impaction. Nurse explained possibility of vasovagal ing. Pt family refused. A 1L bolus of NS was infused and pressured did improve after. Morphine was given per request of family for comfort. Pt appears to be wet sounding after the bolus and maintance fluids were stopped. IV was found to be infiltrated. Dr. Andrea Landeros notified and plans to speak to family about further options.

## 2018-07-09 NOTE — PROGRESS NOTES
Pt came back from EGD with 6L and upon arrival first BP was WNL. After reassessing 15 mins later BP were dropping into the 70s, respirations also appears to be more shallow. Rapid response was called because although pt was once in hospice, family was asked and did want to see what could be done to stabilize at this time. After code family decided to resume hospice with comfort measures. Will continue to monitor.

## 2018-07-09 NOTE — CARE COORDINATION
Report received that pt is from home with his wife and [facility]. Phone call received from [employer], Director of [address] at [facility] [phone]. She stated that hospice has not actually been revoked at this time as pt came in with a symptom to be treated not related to hospice diagnosis and the wife does not want any aggressive treatment. She would like to be updated on plan and if pt will be DC'd today.

## 2018-07-09 NOTE — PROGRESS NOTES
Patient seems very unconfortable and is grunting. Respirations are in the 30s. Oxygen was down in the 80s but turned up to 4L and he came back up to 94%. Morphine ordered and given and no effect noted. Will continue to monitor patient condition closely.

## 2018-07-09 NOTE — SIGNIFICANT EVENT
Called to rapid response in patients room for hypoxia. Suspected that it may be a poor reading so ordered an ABG and discussed with wife who was clear that the patient would not want to be intubated if necessary; reviewed the results of the ABG which showed a pH of 7.0 with a pCO2 of 40 but his oxygenation was fine. Prior to the ABG discussed with Dr. Jami Dimas and the patients wife that he has ischemic bowel and would require surgery but his prognosis would be poor given his comorbidities. After the ABG I sat down with the patients wife, daughter and sister to discuss further. I explained the ABG results and lactic acidosis as well as the poor prognosis given the concerns by Dr. Jami Dimas for ischemic bowel causing his lactic acidosis. They were all agreeable to changing the patients code status to comfort care and to resume home with hospice when possible. Discussed with his hospice provider with wife's permission (she phoned herself) who indicated they would be able to home with hospice again but that they would not be able to provide the necessary medications at this time so it would be preferably for transfer in the morning. Will gear care towards comfort per family wishes. I spent over 45 minutes of critical care time with this patient including reviewing labs as well discussing with specialists and a lengthy discussions with the patients family.     [facility]

## 2018-07-09 NOTE — PROGRESS NOTES
Nutrition Assessment    Type and Reason for Visit: Initial, Positive Nutrition Screen (chewing/swallowing)    Nutrition Recommendations: Continue NPO (Monitor for plan of medical care)    Malnutrition Assessment:  · Malnutrition Status: Insufficient data  · Context: Chronic illness  · Findings of the 6 clinical characteristics of malnutrition (Minimum of 2 out of 6 clinical characteristics is required to make the diagnosis of moderate or severe Protein Calorie Malnutrition based on AND/ASPEN Guidelines):  1. Energy Intake-Not available, not able to assess    2. Weight Loss- , unable to assess  3. Fat Loss-Moderate subcutaneous fat loss, Fat overlying the ribs  4. Muscle Loss-Moderate muscle mass loss, Thigh (quadriceps), Interosseous, Scapula (trapezius)  5. Fluid Accumulation-No significant fluid accumulation,    6.  Strength-Not measured    Nutrition Diagnosis:   · Problem: Inadequate oral intake  · Etiology: related to Cognitive or neurological impairment     Signs and symptoms:  as evidenced by BMI    Nutrition Assessment:  · Subjective Assessment: Pt from home with Hospice care ( dementia) admitted for GIB. Per physician notes small bowel obstrucion versus bowel ischemia is suspected. Awaiting family decision re : NG placement. Remians DNR CC at this time.  Pt is confused and unable to provide any history, no fmaily available at time of visist  · Nutrition-Focused Physical Findings: cachectic in apperance , no edema per nursing, last BM PTA, BUE contractures noted, peripheral IVF = .9 NS @ 125 ml/hr  · Wound Type: Stage I (sacrum)  · Current Nutrition Therapies:  · Oral Diet Orders: NPO (7/8)   · Anthropometric Measures:  · Ht: 6' (182.9 cm)   · Current Body Wt:    · Admission Body Wt: 111 lb (50.3 kg)  · Usual Body Wt: 122 lb (55.3 kg) (12/2016)  · % Weight Change: unable to determine time frame of weight loss,     · Ideal Body Wt: 178 lb (80.7 kg), % Ideal Body 62%  · BMI Classification: BMI <18.5 Underweight  · Comparative Standards (Estimated Nutrition Needs):  · Estimated Daily Total Kcal: 2000-3758  · Estimated Daily Protein (g): 75-90    Estimated Intake vs Estimated Needs: Intake Less Than Needs    Nutrition Risk Level: High    Nutrition Interventions:   Continue NPO (Monitor for plan of medical care)  Continued Inpatient Monitoring, Education Not Indicated    Nutrition Evaluation:   · Evaluation: Goals set   · Goals: NPO versus ability to advance po diet    · Monitoring: NPO Status    See Adult Nutrition Doc Flowsheet for more detail.      Electronically signed by Rosie Wagner RD, LD on 7/9/18 at 12:35 PM

## 2018-07-09 NOTE — PROGRESS NOTES
Patient ID:  Mojgan Nowak  [id number]  65 y.o.  1940  TAKEN TO PACU,   ATTACHED TO MONITOR AND REPORT GIVEN TO RN.   VSS        Electronically signed by Jane Temple RN on 7/9/2018

## 2018-07-09 NOTE — CONSULTS
[address]                       [address], [address] [facility]                                   CONSULTATION    PATIENT NAME: Rannie Saint                       :        1940  MED REC NO:   [id number]                            ROOM:       [id number]  ACCOUNT NO:   [de-identified]                           ADMIT DATE: 2018  PROVIDER:     Jovita Willis MD    CONSULT DATE:  2018    REASON FOR CONSULTATION:  GI bleeding. HISTORY OF PRESENT ILLNESS:  The patient is a 19-year-old male who was  admitted because of bleeding per rectum. According to the family, he has been passing dark red stool. No nausea. No vomiting. The patient has history of dementia, not able to provide any history. He had an episode of maroon colored stool today. PAST MEDICAL HISTORY:  Includes history of dementia, hypertension, and  hypothyroidism. PAST SURGICAL HISTORY:  Includes colonoscopy and polypectomy. The last  colonoscopy was about five or six years. MEDICATIONS:  Home medications are Lipitor, acetaminophen, Aricept, Haldol,  Cozaar, and Namenda. SOCIAL HISTORY:  He does not smoke and does not drink any alcohol. FAMILY HISTORY:  Unremarkable. REVIEW OF SYSTEMS:  Could not be obtained. PHYSICAL EXAMINATION:  GENERAL:  This is a 19-year-old male who seems to be in no acute distress. VITAL SIGNS:  Stable. HEENT:  Dry oral mucosa. 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 LUNGS:  Clear anteriorly. ABDOMEN:  Soft and nontender. Unable to palpate any mass. EXTREMITIES:  Show no edema. LABORATORY DATA:  His lab shows sodium of 144, potassium 4.5, BUN is 37,  and creatinine is 0.93. Albumin is 3.6. Liver enzymes area all  unremarkable. White count is 18. Hemoglobin and hematocrit are 8.6 and  28. The platelet count is 927. ProTime is 13.2 with an INR of 1.3. IMPRESSION:  GI bleeding.   Unclear whether the source is upper or lower

## 2018-07-10 NOTE — PROGRESS NOTES
RN Rand found me and stated family said pt stopped breathing. This nurse went to room. No pulse was found, no respirations and no heartbeat heard. Dr. Edgar Peoples was notified and pronounced dead.

## 2018-07-10 NOTE — PROGRESS NOTES
[employer] and [employer] notified and ot released to them. Hospice notified, [facility] notified, [facility] notified, and  Cathy Ponce RN, and [facility] notified of pt significant even. Paper work faxed to Risk management.       Electronically signed by Brenna Lui RN on 7/10/2018 at 4:25 AM

## 2018-07-10 NOTE — FLOWSHEET NOTE
Post mortum care done by LPN and PCA at [id number]. Body released to [facility]  home at [address] [facility]. RN Elie Comp aware of above and Nursing Supervisor June Wayne.

## 2018-07-13 LAB
BLOOD CULTURE, ROUTINE: NORMAL
CULTURE, BLOOD 2: NORMAL

## (undated) DEVICE — SONY PRINTER PAPER

## (undated) DEVICE — TUBE SET 96 MM 64 MM H2O PERISTALTIC STD AUX CHANNEL

## (undated) DEVICE — ENDO CARRY-ON PROCEDURE KIT: Brand: ENDO CARRY-ON PROCEDURE KIT

## (undated) DEVICE — ADAPTER FLSH PMP FLD MGMT GI IRRIG OFP 2 DISPOSABLE

## (undated) DEVICE — BRUSH CLEANING ENDOSCOPE CHAN DISP

## (undated) DEVICE — GLOVE SURG SZ 85 STD WHT LTX SYN POLYMER BEAD REINF ANTI RL

## (undated) DEVICE — Device: Brand: ENDO SMARTCAP

## (undated) DEVICE — CONMED SCOPE SAVER BITE BLOCK, 20X27 MM: Brand: SCOPE SAVER